# Patient Record
Sex: MALE | Race: ASIAN | Employment: UNEMPLOYED
[De-identification: names, ages, dates, MRNs, and addresses within clinical notes are randomized per-mention and may not be internally consistent; named-entity substitution may affect disease eponyms.]

---

## 2024-09-05 ENCOUNTER — APPOINTMENT (OUTPATIENT)
Facility: HOSPITAL | Age: 19
End: 2024-09-05

## 2024-09-05 ENCOUNTER — HOSPITAL ENCOUNTER (EMERGENCY)
Facility: HOSPITAL | Age: 19
Discharge: HOME | End: 2024-09-05
Attending: PEDIATRICS

## 2024-09-05 VITALS
TEMPERATURE: 97.6 F | DIASTOLIC BLOOD PRESSURE: 75 MMHG | WEIGHT: 123.68 LBS | HEART RATE: 70 BPM | SYSTOLIC BLOOD PRESSURE: 128 MMHG | RESPIRATION RATE: 20 BRPM | OXYGEN SATURATION: 97 %

## 2024-09-05 DIAGNOSIS — S62.015A CLOSED NONDISPLACED FRACTURE OF DISTAL POLE OF SCAPHOID BONE OF LEFT WRIST, INITIAL ENCOUNTER: Primary | ICD-10-CM

## 2024-09-05 PROCEDURE — 6370000000 HC RX 637 (ALT 250 FOR IP)

## 2024-09-05 PROCEDURE — 73110 X-RAY EXAM OF WRIST: CPT

## 2024-09-05 PROCEDURE — 6360000002 HC RX W HCPCS

## 2024-09-05 PROCEDURE — 99284 EMERGENCY DEPT VISIT MOD MDM: CPT

## 2024-09-05 PROCEDURE — 90471 IMMUNIZATION ADMIN: CPT

## 2024-09-05 PROCEDURE — 90714 TD VACC NO PRESV 7 YRS+ IM: CPT

## 2024-09-05 RX ORDER — GINSENG 100 MG
CAPSULE ORAL
Status: COMPLETED | OUTPATIENT
Start: 2024-09-05 | End: 2024-09-05

## 2024-09-05 RX ADMIN — CLOSTRIDIUM TETANI TOXOID ANTIGEN (FORMALDEHYDE INACTIVATED) AND CORYNEBACTERIUM DIPHTHERIAE TOXOID ANTIGEN (FORMALDEHYDE INACTIVATED) 0.5 ML: 5; 2 INJECTION, SUSPENSION INTRAMUSCULAR at 18:01

## 2024-09-05 RX ADMIN — BACITRACIN 1 EACH: 500 OINTMENT TOPICAL at 18:01

## 2024-09-05 NOTE — ED NOTES
Patient discharged home. Patient acting age appropriately, respirations regular and unlabored, cap refill less than two seconds. Skin pink, dry and warm. Lungs clear bilaterally. Patient has tolerated PO in the ED. No further complaints at this time. Patient verbalized understanding of discharge paperwork and has no further questions at this time.    Education provided about continuation of care, follow up care (ortho) and medication administration. Patient able to provided teach back about discharge instructions.   Education provided on infection prevention and control including proper hand hygiene and isolating while sick.

## 2024-09-05 NOTE — DISCHARGE INSTRUCTIONS
You were found to have a fracture of your left wrist today in the emergency department.  You should remain in the splint until you see orthopedics.  Call the number above to schedule an appointment.  You can take ibuprofen or Tylenol as needed for pain.
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
## 2024-09-06 ENCOUNTER — HOSPITAL ENCOUNTER (EMERGENCY)
Facility: HOSPITAL | Age: 19
Discharge: HOME | End: 2024-09-06
Attending: EMERGENCY MEDICINE

## 2024-09-06 VITALS
HEART RATE: 64 BPM | BODY MASS INDEX: 18.41 KG/M2 | HEIGHT: 68 IN | SYSTOLIC BLOOD PRESSURE: 143 MMHG | RESPIRATION RATE: 20 BRPM | DIASTOLIC BLOOD PRESSURE: 79 MMHG | TEMPERATURE: 98.7 F | WEIGHT: 121.47 LBS | OXYGEN SATURATION: 100 %

## 2024-09-06 DIAGNOSIS — S62.102A CLOSED FRACTURE OF LEFT WRIST, INITIAL ENCOUNTER: Primary | ICD-10-CM

## 2024-09-06 PROCEDURE — 99283 EMERGENCY DEPT VISIT LOW MDM: CPT

## 2024-09-06 PROCEDURE — 6370000000 HC RX 637 (ALT 250 FOR IP): Performed by: EMERGENCY MEDICINE

## 2024-09-06 RX ORDER — IBUPROFEN 600 MG/1
600 TABLET, FILM COATED ORAL
Status: COMPLETED | OUTPATIENT
Start: 2024-09-06 | End: 2024-09-06

## 2024-09-06 RX ADMIN — IBUPROFEN 600 MG: 600 TABLET, FILM COATED ORAL at 01:50

## 2024-09-06 ASSESSMENT — PAIN DESCRIPTION - ORIENTATION: ORIENTATION: LEFT

## 2024-09-06 ASSESSMENT — PAIN SCALES - GENERAL
PAINLEVEL_OUTOF10: 7
PAINLEVEL_OUTOF10: 8

## 2024-09-06 ASSESSMENT — PAIN DESCRIPTION - LOCATION: LOCATION: WRIST

## 2024-09-06 NOTE — ED NOTES
Pt discharged home with parent/guardian. Pt acting age appropriately, respirations regular and unlabored, cap refill less than two seconds. Skin pink, dry and warm. Lungs clear bilaterally. No further complaints at this time. Parent/guardian verbalized understanding of discharge paperwork and has no further questions at this time.    Education provided about continuation of care, follow up care with pcp, ortho surgery, and medication administration. Parent/guardian able to provided teach back about discharge instructions.
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
## 2024-09-06 NOTE — DISCHARGE INSTRUCTIONS
Wear the splint at all times.  Call to schedule appointment with orthopedics.  Ice the left wrist.  You may obtain ibuprofen at grocery stores, pharmacies, sometimes convenient stores and gas stations.  Take 2 over-the-counter tablets of ibuprofen 200 mg each every 6 hours as needed for pain.    -----?????? ??????? ?? ???? ???????.  ???? ?????? ???? ?? ????? ??????.  ?? ????? ??? ????? ??????.  ????? ?????? ??? ???????????? ?? ????? ??????? ?????????? ???????? ??????? ?????? ??????.  ????? ????? ?? ???????????? 200 ??? ??? ???? ???? ?? 6 ????? ??? ?????? ?????? ?????.

## 2024-09-06 NOTE — ED TRIAGE NOTES
Patient arrives stating that he was seen earlier and dx with a fracture in the left wrist. Patient states he wants pain medicine, patient states that he hasn't taken any pain medicine since he was discharged from the ED earlier.     Patient states 10/10 pain at this time.